# Patient Record
Sex: MALE | Race: OTHER | NOT HISPANIC OR LATINO
[De-identification: names, ages, dates, MRNs, and addresses within clinical notes are randomized per-mention and may not be internally consistent; named-entity substitution may affect disease eponyms.]

---

## 2024-02-06 ENCOUNTER — APPOINTMENT (OUTPATIENT)
Dept: UROLOGY | Facility: CLINIC | Age: 49
End: 2024-02-06

## 2024-02-29 ENCOUNTER — APPOINTMENT (OUTPATIENT)
Dept: UROLOGY | Facility: CLINIC | Age: 49
End: 2024-02-29
Payer: COMMERCIAL

## 2024-02-29 VITALS
SYSTOLIC BLOOD PRESSURE: 154 MMHG | WEIGHT: 150 LBS | HEART RATE: 68 BPM | BODY MASS INDEX: 24.99 KG/M2 | HEIGHT: 65 IN | DIASTOLIC BLOOD PRESSURE: 95 MMHG | OXYGEN SATURATION: 97 %

## 2024-02-29 DIAGNOSIS — Z78.9 OTHER SPECIFIED HEALTH STATUS: ICD-10-CM

## 2024-02-29 DIAGNOSIS — Z80.8 FAMILY HISTORY OF MALIGNANT NEOPLASM OF OTHER ORGANS OR SYSTEMS: ICD-10-CM

## 2024-02-29 DIAGNOSIS — N39.43 POST-VOID DRIBBLING: ICD-10-CM

## 2024-02-29 PROBLEM — Z00.00 ENCOUNTER FOR PREVENTIVE HEALTH EXAMINATION: Status: ACTIVE | Noted: 2024-02-29

## 2024-02-29 PROCEDURE — 99204 OFFICE O/P NEW MOD 45 MIN: CPT

## 2024-02-29 RX ORDER — TADALAFIL 5 MG/1
5 TABLET ORAL
Qty: 90 | Refills: 3 | Status: ACTIVE | COMMUNITY
Start: 2024-02-29 | End: 1900-01-01

## 2024-02-29 NOTE — PLAN
[TextEntry] : low libido and ED - check testosterone trial 5mg daily tadalafil for both his ED and his urinary dribbling (possible bph) bladder US and urine culture to assess his urinary dribbling psa for prostate cancer screening  follow up in 4-6 weeks for results and see how well his medication is working

## 2024-02-29 NOTE — HISTORY OF PRESENT ILLNESS
[FreeTextEntry1] : erectile dysfunciton for two years -- trouble maintaining lower sex drive  took tadalafil and sildenafil -- terrible headaches after erection for 3 hours- not sure of the dose  vasectomy 16 years ago  has a odd sleep schedule  feb 2024 ipss- 17- moderate symptoms - mostly satisfied IIEF -- 12 -- mild/moderate ED

## 2024-03-05 ENCOUNTER — OUTPATIENT (OUTPATIENT)
Dept: OUTPATIENT SERVICES | Facility: HOSPITAL | Age: 49
LOS: 1 days | End: 2024-03-05
Payer: COMMERCIAL

## 2024-03-05 ENCOUNTER — RESULT REVIEW (OUTPATIENT)
Age: 49
End: 2024-03-05

## 2024-03-05 DIAGNOSIS — N39.43 POST-VOID DRIBBLING: ICD-10-CM

## 2024-03-05 PROCEDURE — 76857 US EXAM PELVIC LIMITED: CPT | Mod: 26

## 2024-03-05 PROCEDURE — 76857 US EXAM PELVIC LIMITED: CPT

## 2024-03-06 DIAGNOSIS — N39.43 POST-VOID DRIBBLING: ICD-10-CM

## 2024-04-01 LAB — BACTERIA UR CULT: NORMAL

## 2024-04-19 ENCOUNTER — APPOINTMENT (OUTPATIENT)
Dept: UROLOGY | Facility: CLINIC | Age: 49
End: 2024-04-19
Payer: COMMERCIAL

## 2024-04-19 PROCEDURE — 99214 OFFICE O/P EST MOD 30 MIN: CPT

## 2024-04-19 PROCEDURE — G2211 COMPLEX E/M VISIT ADD ON: CPT

## 2024-04-19 RX ORDER — CLOMIPHENE CITRATE 50 MG/1
50 TABLET ORAL
Qty: 30 | Refills: 5 | Status: ACTIVE | COMMUNITY
Start: 2024-04-19 | End: 1900-01-01

## 2024-06-21 ENCOUNTER — APPOINTMENT (OUTPATIENT)
Dept: UROLOGY | Facility: CLINIC | Age: 49
End: 2024-06-21
Payer: COMMERCIAL

## 2024-06-21 VITALS
DIASTOLIC BLOOD PRESSURE: 90 MMHG | WEIGHT: 150 LBS | SYSTOLIC BLOOD PRESSURE: 141 MMHG | TEMPERATURE: 98 F | BODY MASS INDEX: 24.99 KG/M2 | HEIGHT: 65 IN

## 2024-06-21 DIAGNOSIS — E34.8 OTHER SPECIFIED ENDOCRINE DISORDERS: ICD-10-CM

## 2024-06-21 DIAGNOSIS — R68.82 DECREASED LIBIDO: ICD-10-CM

## 2024-06-21 DIAGNOSIS — N52.01 ERECTILE DYSFUNCTION DUE TO ARTERIAL INSUFFICIENCY: ICD-10-CM

## 2024-06-21 DIAGNOSIS — E29.1 TESTICULAR HYPOFUNCTION: ICD-10-CM

## 2024-06-21 DIAGNOSIS — Z12.5 ENCOUNTER FOR SCREENING FOR MALIGNANT NEOPLASM OF PROSTATE: ICD-10-CM

## 2024-06-21 PROCEDURE — 99214 OFFICE O/P EST MOD 30 MIN: CPT

## 2024-06-21 PROCEDURE — G2211 COMPLEX E/M VISIT ADD ON: CPT | Mod: NC

## 2024-06-21 RX ORDER — ANASTROZOLE TABLETS 1 MG/1
1 TABLET ORAL
Qty: 30 | Refills: 1 | Status: ACTIVE | COMMUNITY
Start: 2024-06-21 | End: 1900-01-01

## 2024-06-22 PROBLEM — Z12.5 SCREENING FOR PROSTATE CANCER: Status: ACTIVE | Noted: 2024-02-29

## 2024-06-22 PROBLEM — N52.01 ERECTILE DYSFUNCTION DUE TO ARTERIAL INSUFFICIENCY: Status: ACTIVE | Noted: 2024-02-29

## 2024-06-22 PROBLEM — E34.8 HYPERESTROGENISM IN MALE: Status: ACTIVE | Noted: 2024-06-22

## 2024-06-22 NOTE — HISTORY OF PRESENT ILLNESS
[FreeTextEntry1] : Pt previously followed with Dr. Ely. Prior note below: 49M w erectile dysfunciton for two years -- trouble maintaining, 5mg tadalafil was causing too rigid erection -- loss of sensation better with half that dos. Also reports decreased libido.  Surg Hx: Vasectomy 16 years ago  IPSS- 17- moderate symptoms - mostly satisfied IIEF -- 12 -- mild/moderate ED  April 2024 PSA - 1.1 Total Testosterone 335  Office visit 6/20/24 Pt has been on 50mg Clomid QOD. Reports having a significant improvement in his hypogonadal symptoms. However, he does reports sx of hot flashes since starting the medication. He reports no breast tenderness or gynecomastia.  The patient denies having any fevers, chills, nausea, vomiting, flank/abdominal pain or any irritative voiding symptoms. He has Two children 20 and 17yo. No longer interested in having children as he already had vasectomy.  Bladder US 4/2024 - prostate 22cc, PVR 16cc  Labs   Bioavailable 312 Estrogen 68 SHBG 23 HCT 43.6 PSA 1.4 CBC wnl LFTs wnl

## 2024-06-22 NOTE — ASSESSMENT
[FreeTextEntry1] : #Hypogonadism/Hyperestrogenism - improvement in patients hypogonadal symptoms  - started on Clomid 50mg QOD last visit with Dr. Ely, he will continue for another ~2 months - will start on arimidex 1mg q2d for ~2 months - Testosterone 763 - Estradiol 68 (elevated) - CBC and LFTs wnl - During our next visit we will discuss transitioning to TRT as I explained to patient that being on Clomid is not a permanent solution for his hypogonadal symptoms and there is limited literature on its efficacy for long term use >1-2 years. He will review his insurance formulary and get back to me with what his best options are. It seems to me that he may be favoring starting topical TRT at our next visit.   #PSA screening - PSA 1.4 6/2024 - Can repeat 6/2025  #Erectile Dysfunction - c/w Cialis 2.5mg -No Plan for Trial of Intracavernosal penile injections with Trimix (Papaverine 30mg/mL, phentolamine 1mg/mL, PGE1 10 mcg/mL)  Erectile dysfunction, its etiology, risk factors and natural history were discussed with the patient. Work-up and empiric management were discussed. From least to most invasive, treatments including behavioral changes (weight loss, exercise, improved sleep), oral PDE5i, vacuum erection device, intraurethral pellets, intracavernosal injections, and penile prosthetic implantation were described. Relevant individual risks and benefits disclosed. I explained that there are different causes for ED including psychogenic, vasculogenic, neurogenic and medication side-effect related causes. Oftentimes there are multiple causes.   The patient understands that the risks of PDE5is include facial redness, flushing, GERD, back pain, priapism, chest pain/MI, arrhythmia, dizziness, drop in BP, impaired vision and loss of hearing. He understands that the medication may take up to an hour to function and requires sexual stimulation. He was told not to take his medication within 4 hours of alpha blockers, or not at all if ever taking nitroglycerin. Both sildenafil and vardenafil, but not tadalafil, have some cross-reactivity with PDE6 and thus may produce visual side effects. He also was told to go to the ER immediately if he noticed any blindness or visual changes, or for any painful erection lasting > 4 hrs. He denies any history nitrate medication use for angina.

## 2024-06-22 NOTE — PLAN

## 2024-08-30 ENCOUNTER — APPOINTMENT (OUTPATIENT)
Dept: UROLOGY | Facility: CLINIC | Age: 49
End: 2024-08-30
Payer: COMMERCIAL

## 2024-08-30 ENCOUNTER — RESULT CHARGE (OUTPATIENT)
Age: 49
End: 2024-08-30

## 2024-08-30 DIAGNOSIS — E34.8 OTHER SPECIFIED ENDOCRINE DISORDERS: ICD-10-CM

## 2024-08-30 DIAGNOSIS — E29.1 TESTICULAR HYPOFUNCTION: ICD-10-CM

## 2024-08-30 DIAGNOSIS — R68.82 DECREASED LIBIDO: ICD-10-CM

## 2024-08-30 DIAGNOSIS — N52.01 ERECTILE DYSFUNCTION DUE TO ARTERIAL INSUFFICIENCY: ICD-10-CM

## 2024-08-30 LAB
BILIRUB UR QL STRIP: NORMAL
COLLECTION METHOD: NORMAL
GLUCOSE UR-MCNC: NORMAL
HCG UR QL: 0.2 EU/DL
HGB UR QL STRIP.AUTO: NORMAL
KETONES UR-MCNC: NORMAL
LEUKOCYTE ESTERASE UR QL STRIP: NORMAL
NITRITE UR QL STRIP: NORMAL
PH UR STRIP: 7
PROT UR STRIP-MCNC: NORMAL
SP GR UR STRIP: 1.02

## 2024-08-30 PROCEDURE — 99214 OFFICE O/P EST MOD 30 MIN: CPT

## 2024-08-30 PROCEDURE — G2211 COMPLEX E/M VISIT ADD ON: CPT | Mod: NC

## 2024-08-30 RX ORDER — TESTOSTERONE 20.25 MG/1.25G
20.25 MG/ACT GEL, METERED TRANSDERMAL
Qty: 4 | Refills: 0 | Status: ACTIVE | COMMUNITY
Start: 2024-08-30 | End: 1900-01-01

## 2024-08-30 NOTE — PLAN

## 2024-08-30 NOTE — ASSESSMENT
[FreeTextEntry1] : #Hypogonadism/Hyperestrogenism - improvement in patients hypogonadal symptoms - on Clomid 50mg QOD (Initially started by Dr. Ely) - prior visit I started on Arimidex 1mg MWF - Testosterone up from 763 to 763 - Estradiol down to 43 from 68 (elevated) - CBC and LFTs wnl - We will transition patient off of Clomid and anastrozole and started on TRT.  Patient reports that he is averse to needles so we will start with topicals. - AndroGel 1.62% 2 pumps daily - Will recheck hormones in 2-4 weeks  #PSA screening - PSA 1.4 6/2024 - Can repeat 6/2025  #Erectile Dysfunction - c/w Cialis 2.5mg -No Plan for Trial of Intracavernosal penile injections with Trimix (Papaverine 30mg/mL, phentolamine 1mg/mL, PGE1 10 mcg/mL)  The patient understands that the risks of PDE5is include facial redness, flushing, GERD, back pain, priapism, chest pain/MI, arrhythmia, dizziness, drop in BP, impaired vision and loss of hearing. He understands that the medication may take up to an hour to function and requires sexual stimulation. He was told not to take his medication within 4 hours of alpha blockers, or not at all if ever taking nitroglycerin. Both sildenafil and vardenafil, but not tadalafil, have some cross-reactivity with PDE6 and thus may produce visual side effects. He also was told to go to the ER immediately if he noticed any blindness or visual changes, or for any painful erection lasting > 4 hrs. He denies any history nitrate medication use for angina.

## 2024-08-30 NOTE — HISTORY OF PRESENT ILLNESS
[FreeTextEntry1] : Pt previously followed with Dr. Ely. Prior note below: 49M w erectile dysfunciton for two years -- trouble maintaining, 5mg tadalafil was causing too rigid erection -- loss of sensation better with half that dose. Also reports decreased libido. Surg Hx: Vasectomy 16 years ago  IPSS- 17- moderate symptoms - mostly satisfied IIEF -- 12 -- mild/moderate ED  April 2024 PSA - 1.1 Total Testosterone 335  Office visit 6/20/24 Pt has been on 50mg Clomid QOD. Reports having a significant improvement in his hypogonadal symptoms. However, he does reports sx of hot flashes since starting the medication. He reports no breast tenderness or gynecomastia. The patient denies having any fevers, chills, nausea, vomiting, flank/abdominal pain or any irritative voiding symptoms. He has Two children 20 and 17yo. No longer interested in having children as he already had vasectomy.  Bladder US 4/2024 - prostate 22cc, PVR 16cc  Labs 6/2024  Bioavailable 312 Estrogen 68 SHBG 23 HCT 43.6 PSA 1.4 CBC wnl LFTs wnl  Office 8/2024 Prior visit patient was started on Arimidex 1mg MWF days in addition to the Clomid 50mg QOD which he was previously started on by Dr. Ely. Pt overall has been tolerating Clomid and anastrozole without any SE.  Today we extensively reviewed all his blood work as noted below. Labs 8/22/24 Total T 773 SHBG 19  AST/ALT - wnl HCT - 47.3 Estradiol - 43

## 2024-10-11 ENCOUNTER — APPOINTMENT (OUTPATIENT)
Dept: UROLOGY | Facility: CLINIC | Age: 49
End: 2024-10-11
Payer: COMMERCIAL

## 2024-10-11 VITALS
HEIGHT: 65 IN | HEART RATE: 74 BPM | RESPIRATION RATE: 18 BRPM | TEMPERATURE: 97.8 F | BODY MASS INDEX: 24.16 KG/M2 | WEIGHT: 145 LBS | SYSTOLIC BLOOD PRESSURE: 157 MMHG | DIASTOLIC BLOOD PRESSURE: 94 MMHG | OXYGEN SATURATION: 98 %

## 2024-10-11 DIAGNOSIS — E29.1 TESTICULAR HYPOFUNCTION: ICD-10-CM

## 2024-10-11 DIAGNOSIS — N52.01 ERECTILE DYSFUNCTION DUE TO ARTERIAL INSUFFICIENCY: ICD-10-CM

## 2024-10-11 DIAGNOSIS — Z12.5 ENCOUNTER FOR SCREENING FOR MALIGNANT NEOPLASM OF PROSTATE: ICD-10-CM

## 2024-10-11 DIAGNOSIS — R68.82 DECREASED LIBIDO: ICD-10-CM

## 2024-10-11 PROCEDURE — 99215 OFFICE O/P EST HI 40 MIN: CPT

## 2024-10-11 PROCEDURE — G2211 COMPLEX E/M VISIT ADD ON: CPT | Mod: NC

## 2024-11-25 ENCOUNTER — APPOINTMENT (OUTPATIENT)
Dept: UROLOGY | Facility: CLINIC | Age: 49
End: 2024-11-25
Payer: COMMERCIAL

## 2024-11-25 VITALS
DIASTOLIC BLOOD PRESSURE: 94 MMHG | OXYGEN SATURATION: 99 % | BODY MASS INDEX: 24.16 KG/M2 | WEIGHT: 145 LBS | HEIGHT: 65 IN | RESPIRATION RATE: 18 BRPM | SYSTOLIC BLOOD PRESSURE: 144 MMHG | HEART RATE: 63 BPM

## 2024-11-25 DIAGNOSIS — E29.1 TESTICULAR HYPOFUNCTION: ICD-10-CM

## 2024-11-25 DIAGNOSIS — N52.01 ERECTILE DYSFUNCTION DUE TO ARTERIAL INSUFFICIENCY: ICD-10-CM

## 2024-11-25 DIAGNOSIS — R68.82 DECREASED LIBIDO: ICD-10-CM

## 2024-11-25 DIAGNOSIS — Z12.5 ENCOUNTER FOR SCREENING FOR MALIGNANT NEOPLASM OF PROSTATE: ICD-10-CM

## 2024-11-25 PROCEDURE — G2211 COMPLEX E/M VISIT ADD ON: CPT | Mod: NC

## 2024-11-25 PROCEDURE — 99215 OFFICE O/P EST HI 40 MIN: CPT

## 2025-04-14 ENCOUNTER — APPOINTMENT (OUTPATIENT)
Dept: UROLOGY | Facility: CLINIC | Age: 50
End: 2025-04-14
Payer: COMMERCIAL

## 2025-04-14 VITALS
WEIGHT: 145 LBS | RESPIRATION RATE: 18 BRPM | HEIGHT: 65 IN | OXYGEN SATURATION: 95 % | HEART RATE: 75 BPM | SYSTOLIC BLOOD PRESSURE: 146 MMHG | DIASTOLIC BLOOD PRESSURE: 51 MMHG | BODY MASS INDEX: 24.16 KG/M2

## 2025-04-14 DIAGNOSIS — Z12.5 ENCOUNTER FOR SCREENING FOR MALIGNANT NEOPLASM OF PROSTATE: ICD-10-CM

## 2025-04-14 DIAGNOSIS — E29.1 TESTICULAR HYPOFUNCTION: ICD-10-CM

## 2025-04-14 DIAGNOSIS — N34.2 OTHER URETHRITIS: ICD-10-CM

## 2025-04-14 PROCEDURE — 99215 OFFICE O/P EST HI 40 MIN: CPT

## 2025-04-14 PROCEDURE — G2211 COMPLEX E/M VISIT ADD ON: CPT | Mod: NC

## 2025-06-05 ENCOUNTER — APPOINTMENT (OUTPATIENT)
Dept: UROLOGY | Facility: CLINIC | Age: 50
End: 2025-06-05
Payer: COMMERCIAL

## 2025-06-05 VITALS
HEART RATE: 72 BPM | TEMPERATURE: 98.4 F | SYSTOLIC BLOOD PRESSURE: 157 MMHG | HEIGHT: 65 IN | DIASTOLIC BLOOD PRESSURE: 96 MMHG | WEIGHT: 143 LBS | RESPIRATION RATE: 16 BRPM | BODY MASS INDEX: 23.82 KG/M2 | OXYGEN SATURATION: 98 %

## 2025-06-05 DIAGNOSIS — L30.9 DERMATITIS, UNSPECIFIED: ICD-10-CM

## 2025-06-05 DIAGNOSIS — Z80.8 FAMILY HISTORY OF MALIGNANT NEOPLASM OF OTHER ORGANS OR SYSTEMS: ICD-10-CM

## 2025-06-05 DIAGNOSIS — Z78.9 OTHER SPECIFIED HEALTH STATUS: ICD-10-CM

## 2025-06-05 DIAGNOSIS — E29.1 TESTICULAR HYPOFUNCTION: ICD-10-CM

## 2025-06-05 DIAGNOSIS — N52.01 ERECTILE DYSFUNCTION DUE TO ARTERIAL INSUFFICIENCY: ICD-10-CM

## 2025-06-05 PROCEDURE — G2211 COMPLEX E/M VISIT ADD ON: CPT | Mod: NC

## 2025-06-05 PROCEDURE — 99214 OFFICE O/P EST MOD 30 MIN: CPT

## 2025-06-05 RX ORDER — LIDOCAINE AND PRILOCAINE 25; 25 MG/G; MG/G
2.5-2.5 CREAM TOPICAL
Qty: 1 | Refills: 11 | Status: ACTIVE | COMMUNITY
Start: 2025-06-05 | End: 1900-01-01

## 2025-06-05 RX ORDER — TRIAMCINOLONE ACETONIDE 0.25 MG/G
0.03 CREAM TOPICAL 3 TIMES DAILY
Qty: 1 | Refills: 0 | Status: ACTIVE | COMMUNITY
Start: 2025-06-05 | End: 1900-01-01